# Patient Record
Sex: MALE | Race: WHITE | NOT HISPANIC OR LATINO | URBAN - METROPOLITAN AREA
[De-identification: names, ages, dates, MRNs, and addresses within clinical notes are randomized per-mention and may not be internally consistent; named-entity substitution may affect disease eponyms.]

---

## 2018-08-07 ENCOUNTER — APPOINTMENT (RX ONLY)
Dept: URBAN - METROPOLITAN AREA CLINIC 77 | Facility: CLINIC | Age: 68
Setting detail: DERMATOLOGY
End: 2018-08-07

## 2018-08-07 DIAGNOSIS — Z41.9 ENCOUNTER FOR PROCEDURE FOR PURPOSES OTHER THAN REMEDYING HEALTH STATE, UNSPECIFIED: ICD-10-CM

## 2018-08-07 DIAGNOSIS — L72.0 EPIDERMAL CYST: ICD-10-CM

## 2018-08-07 PROCEDURE — ? TREATMENT REGIMEN

## 2018-08-07 PROCEDURE — ? MEDICAL CONSULTATION: DERMAPEN

## 2018-08-07 PROCEDURE — ? COUNSELING

## 2018-08-07 PROCEDURE — ? RECOMMENDATIONS

## 2018-08-07 PROCEDURE — 99213 OFFICE O/P EST LOW 20 MIN: CPT

## 2018-08-07 PROCEDURE — ? OTHER

## 2018-08-07 ASSESSMENT — LOCATION SIMPLE DESCRIPTION DERM
LOCATION SIMPLE: LEFT CHEEK
LOCATION SIMPLE: RIGHT FOREHEAD

## 2018-08-07 ASSESSMENT — LOCATION DETAILED DESCRIPTION DERM
LOCATION DETAILED: LEFT CENTRAL MALAR CHEEK
LOCATION DETAILED: RIGHT FOREHEAD

## 2018-08-07 ASSESSMENT — LOCATION ZONE DERM: LOCATION ZONE: FACE

## 2018-08-07 NOTE — PROCEDURE: RECOMMENDATIONS
Detail Level: Zone
Recommendation Preamble: The following recommendations were made during the visit: Skin Pen counseling. Informed price is $700 for a package of 3

## 2018-08-07 NOTE — HPI: OTHER
Condition:: Spot
Please Describe Your Condition:: comes in for a chief complaint of Spot. Patient complains of a spot on the  right forehead x 2 months. Patient reports the spot was bigger and scaly, he states after he went to see his doctor for an ear infection. He was given an antibiotic (amoxicillin) and since then his spots has gotten way smaller. He denies drainage, pain, tenderness, or bleeding. Patient is here for further evaluation and management.

## 2018-08-07 NOTE — PROCEDURE: OTHER
Other (Free Text): Courtesy extraction with q-tip
Detail Level: Detailed
Note Text (......Xxx Chief Complaint.): This diagnosis correlates with the

## 2018-08-23 ENCOUNTER — APPOINTMENT (RX ONLY)
Dept: URBAN - METROPOLITAN AREA CLINIC 77 | Facility: CLINIC | Age: 68
Setting detail: DERMATOLOGY
End: 2018-08-23

## 2018-08-23 DIAGNOSIS — I83.89 VARICOSE VEINS OF LOWER EXTREMITIES WITH OTHER COMPLICATIONS: ICD-10-CM

## 2018-08-23 DIAGNOSIS — L81.4 OTHER MELANIN HYPERPIGMENTATION: ICD-10-CM

## 2018-08-23 DIAGNOSIS — L82.1 OTHER SEBORRHEIC KERATOSIS: ICD-10-CM

## 2018-08-23 DIAGNOSIS — D18.0 HEMANGIOMA: ICD-10-CM

## 2018-08-23 DIAGNOSIS — D22 MELANOCYTIC NEVI: ICD-10-CM

## 2018-08-23 DIAGNOSIS — L21.8 OTHER SEBORRHEIC DERMATITIS: ICD-10-CM

## 2018-08-23 PROBLEM — D18.01 HEMANGIOMA OF SKIN AND SUBCUTANEOUS TISSUE: Status: ACTIVE | Noted: 2018-08-23

## 2018-08-23 PROBLEM — I83.899 VARICOSE VEINS OF UNSPECIFIED LOWER EXTREMITY WITH OTHER COMPLICATIONS: Status: ACTIVE | Noted: 2018-08-23

## 2018-08-23 PROBLEM — D22.5 MELANOCYTIC NEVI OF TRUNK: Status: ACTIVE | Noted: 2018-08-23

## 2018-08-23 PROCEDURE — ? PRESCRIPTION

## 2018-08-23 PROCEDURE — 99214 OFFICE O/P EST MOD 30 MIN: CPT

## 2018-08-23 PROCEDURE — ? COUNSELING

## 2018-08-23 PROCEDURE — ? RECOMMENDATIONS

## 2018-08-23 RX ORDER — CICLOPIROX 1 %
SHAMPOO TOPICAL
Qty: 1 | Refills: 6 | Status: ERX | COMMUNITY
Start: 2018-08-23

## 2018-08-23 RX ORDER — HYDROCORTISONE 25 MG/G
CREAM TOPICAL
Qty: 1 | Refills: 6 | Status: ERX | COMMUNITY
Start: 2018-08-23

## 2018-08-23 RX ADMIN — HYDROCORTISONE: 25 CREAM TOPICAL at 19:09

## 2018-08-23 RX ADMIN — Medication: at 19:09

## 2018-08-23 ASSESSMENT — LOCATION DETAILED DESCRIPTION DERM
LOCATION DETAILED: RIGHT MID-UPPER BACK
LOCATION DETAILED: RIGHT LATERAL SUPERIOR CHEST
LOCATION DETAILED: EPIGASTRIC SKIN
LOCATION DETAILED: GLABELLA

## 2018-08-23 ASSESSMENT — LOCATION SIMPLE DESCRIPTION DERM
LOCATION SIMPLE: CHEST
LOCATION SIMPLE: GLABELLA
LOCATION SIMPLE: ABDOMEN
LOCATION SIMPLE: RIGHT UPPER BACK

## 2018-08-23 ASSESSMENT — LOCATION ZONE DERM
LOCATION ZONE: TRUNK
LOCATION ZONE: FACE

## 2018-08-23 NOTE — PROCEDURE: RECOMMENDATIONS
Recommendation Preamble: The following recommendations were made during the visit: Referred to [facility] and [facility] medical group information was given
Detail Level: Zone

## 2018-08-23 NOTE — HPI: SKIN LESION
Is This A New Presentation, Or A Follow-Up?: Moles
Additional History: Pt denies any new or changing moles. Pt complains of rough dry scaly patch on the glabella area x months. He has tried only otc Head and Shoulders shampoo and hydrocortisone cream with no help. He would like to discuss different treatment options

## 2018-08-27 RX ORDER — CICLOPIROX 1 %
SHAMPOO TOPICAL
Qty: 1 | Refills: 6 | Status: ERX

## 2018-08-30 ENCOUNTER — RX ONLY (OUTPATIENT)
Age: 68
Setting detail: RX ONLY
End: 2018-08-30

## 2018-08-30 RX ORDER — KETOCONAZOLE 20.5 MG/ML
SHAMPOO, SUSPENSION TOPICAL
Qty: 1 | Refills: 6 | Status: ERX | COMMUNITY
Start: 2018-08-30